# Patient Record
Sex: FEMALE | Race: WHITE | URBAN - METROPOLITAN AREA
[De-identification: names, ages, dates, MRNs, and addresses within clinical notes are randomized per-mention and may not be internally consistent; named-entity substitution may affect disease eponyms.]

---

## 2022-03-16 ENCOUNTER — OFFICE VISIT (OUTPATIENT)
Dept: URBAN - METROPOLITAN AREA CLINIC 22 | Facility: CLINIC | Age: 72
End: 2022-03-16
Payer: MEDICARE

## 2022-03-16 DIAGNOSIS — H25.813 COMBINED FORMS OF AGE-RELATED CATARACT, BILATERAL: Primary | ICD-10-CM

## 2022-03-16 DIAGNOSIS — H43.22 CRYSTALLINE DEPOSITS IN VITREOUS BODY, LEFT EYE: ICD-10-CM

## 2022-03-16 DIAGNOSIS — H43.811 VITREOUS DEGENERATION, RIGHT EYE: ICD-10-CM

## 2022-03-16 DIAGNOSIS — H35.371 PUCKERING OF MACULA, RIGHT EYE: ICD-10-CM

## 2022-03-16 PROCEDURE — 92134 CPTRZ OPH DX IMG PST SGM RTA: CPT | Performed by: STUDENT IN AN ORGANIZED HEALTH CARE EDUCATION/TRAINING PROGRAM

## 2022-03-16 PROCEDURE — 99204 OFFICE O/P NEW MOD 45 MIN: CPT | Performed by: STUDENT IN AN ORGANIZED HEALTH CARE EDUCATION/TRAINING PROGRAM

## 2022-03-16 ASSESSMENT — INTRAOCULAR PRESSURE
OD: 18
OS: 18

## 2022-03-16 ASSESSMENT — VISUAL ACUITY
OS: 20/20
OD: 20/25

## 2022-03-16 NOTE — IMPRESSION/PLAN
Impression: Crystalline deposits in vitreous body, left eye: H43.22. Plan: Discussed findings -- retina intact OU today. Reviewed signs and symptoms of retinal tear and detachment and patient educated to RTC STAT if experiencing.

## 2022-03-16 NOTE — IMPRESSION/PLAN
Impression: Puckering of macula, right eye: H35.371. Plan: Discussed findings. OCT ordered: mild ERM, minimal change to foveal contour and no cystic spaces. Treatment not indicated at this time. Monitor in 1 year with DFE and OCT macula.

## 2022-03-16 NOTE — IMPRESSION/PLAN
Impression: Combined forms of age-related cataract, bilateral: H25.813. Plan: Visually significant cataract OD, accounts for patient's complaints. OCT ordered and reviewed, shows mild ERM OD. Discussed risks/benefits of cataract surgery. Pt would like to pursue surgery -- recommend surgical consult. Briefly discussed advanced technology.

## 2022-04-13 ENCOUNTER — OFFICE VISIT (OUTPATIENT)
Dept: URBAN - METROPOLITAN AREA CLINIC 23 | Facility: CLINIC | Age: 72
End: 2022-04-13
Payer: MEDICARE

## 2022-04-13 DIAGNOSIS — H35.371 PUCKERING OF MACULA, RIGHT EYE: ICD-10-CM

## 2022-04-13 DIAGNOSIS — H25.13 AGE-RELATED NUCLEAR CATARACT, BILATERAL: Primary | ICD-10-CM

## 2022-04-13 PROCEDURE — 99203 OFFICE O/P NEW LOW 30 MIN: CPT | Performed by: OPHTHALMOLOGY

## 2022-04-13 NOTE — IMPRESSION/PLAN
Impression: Age-related nuclear cataract, bilateral: H25.13. Condition: established, worsening. Plan: Discussed diagnosis in detail with patient. No surgical treatment currently recommended. The patient will monitor vision changes and contact us with any decrease in vision. Recommend frequent use of artificial tears especially while reading. Recommend wearing yellow-orange tint glasses at night while driving to reduce glare.

## 2022-04-13 NOTE — IMPRESSION/PLAN
Impression: Puckering of macula, right eye: H35.371. Condition: established, stable. Pt has no ADL issues at this time. Plan: Discussed diagnosis in detail with patient. Advised patient of condition. Discussed treatment options with patient. No treatment is required at this time. Will continue to observe condition and or symptoms.

## 2024-05-24 ENCOUNTER — OFFICE VISIT (OUTPATIENT)
Dept: URBAN - METROPOLITAN AREA CLINIC 23 | Facility: CLINIC | Age: 74
End: 2024-05-24
Payer: COMMERCIAL

## 2024-05-24 DIAGNOSIS — H25.13 AGE-RELATED NUCLEAR CATARACT, BILATERAL: Primary | ICD-10-CM

## 2024-05-24 PROCEDURE — 99214 OFFICE O/P EST MOD 30 MIN: CPT | Performed by: OPHTHALMOLOGY

## 2024-05-24 RX ORDER — OFLOXACIN 3 MG/ML
0.3 % SOLUTION/ DROPS OPHTHALMIC
Qty: 5 | Refills: 1 | Status: ACTIVE
Start: 2024-05-24

## 2024-05-24 RX ORDER — KETOROLAC TROMETHAMINE 5 MG/ML
0.5 % SOLUTION OPHTHALMIC
Qty: 5 | Refills: 1 | Status: ACTIVE
Start: 2024-05-24

## 2024-05-24 RX ORDER — PREDNISOLONE ACETATE 10 MG/ML
1 % SUSPENSION/ DROPS OPHTHALMIC
Qty: 10 | Refills: 1 | Status: ACTIVE
Start: 2024-05-24

## 2024-05-24 ASSESSMENT — VISUAL ACUITY
OS: 20/25
OD: 20/40

## 2024-05-24 ASSESSMENT — KERATOMETRY
OS: 43.38
OD: 43.38

## 2024-05-24 ASSESSMENT — INTRAOCULAR PRESSURE
OD: 18
OS: 18

## 2024-06-05 ENCOUNTER — TECH ONLY (OUTPATIENT)
Dept: URBAN - METROPOLITAN AREA CLINIC 23 | Facility: CLINIC | Age: 74
End: 2024-06-05
Payer: COMMERCIAL

## 2024-06-05 DIAGNOSIS — H25.13 AGE-RELATED NUCLEAR CATARACT, BILATERAL: Primary | ICD-10-CM

## 2024-06-05 ASSESSMENT — PACHYMETRY
OD: 2.87
OS: 22.99
OS: 2.91
OD: 23.34

## 2024-06-13 ENCOUNTER — POST-OPERATIVE VISIT (OUTPATIENT)
Dept: URBAN - METROPOLITAN AREA CLINIC 23 | Facility: CLINIC | Age: 74
End: 2024-06-13
Payer: COMMERCIAL

## 2024-06-13 DIAGNOSIS — Z48.810 ENCOUNTER FOR SURGICAL AFTERCARE FOLLOWING SURGERY ON A SENSE ORGAN: Primary | ICD-10-CM

## 2024-06-13 PROCEDURE — 99024 POSTOP FOLLOW-UP VISIT: CPT

## 2024-06-13 ASSESSMENT — INTRAOCULAR PRESSURE
OS: 18
OD: 18

## 2024-06-18 ENCOUNTER — POST-OPERATIVE VISIT (OUTPATIENT)
Dept: URBAN - METROPOLITAN AREA CLINIC 23 | Facility: CLINIC | Age: 74
End: 2024-06-18
Payer: COMMERCIAL

## 2024-06-18 DIAGNOSIS — Z48.810 ENCOUNTER FOR SURGICAL AFTERCARE FOLLOWING SURGERY ON A SENSE ORGAN: Primary | ICD-10-CM

## 2024-06-18 PROCEDURE — 99024 POSTOP FOLLOW-UP VISIT: CPT

## 2024-06-18 ASSESSMENT — INTRAOCULAR PRESSURE
OS: 17
OD: 17

## 2024-06-18 ASSESSMENT — VISUAL ACUITY
OS: 20/30
OD: 20/40

## 2024-06-25 ENCOUNTER — SURGERY (OUTPATIENT)
Dept: URBAN - METROPOLITAN AREA SURGERY 11 | Facility: SURGERY | Age: 74
End: 2024-06-25
Payer: COMMERCIAL

## 2024-06-25 PROCEDURE — PR1CP PR1CP: CUSTOM | Performed by: OPHTHALMOLOGY

## 2024-06-25 PROCEDURE — 66984 XCAPSL CTRC RMVL W/O ECP: CPT | Performed by: OPHTHALMOLOGY

## 2024-06-26 ENCOUNTER — POST-OPERATIVE VISIT (OUTPATIENT)
Dept: URBAN - METROPOLITAN AREA CLINIC 23 | Facility: CLINIC | Age: 74
End: 2024-06-26
Payer: COMMERCIAL

## 2024-06-26 DIAGNOSIS — Z96.1 PRESENCE OF INTRAOCULAR LENS: Primary | ICD-10-CM

## 2024-06-26 PROCEDURE — 99024 POSTOP FOLLOW-UP VISIT: CPT

## 2024-06-26 ASSESSMENT — INTRAOCULAR PRESSURE
OS: 18
OD: 18

## 2024-07-02 ENCOUNTER — POST-OPERATIVE VISIT (OUTPATIENT)
Dept: URBAN - METROPOLITAN AREA CLINIC 23 | Facility: CLINIC | Age: 74
End: 2024-07-02
Payer: COMMERCIAL

## 2024-07-02 DIAGNOSIS — Z96.1 PRESENCE OF INTRAOCULAR LENS: Primary | ICD-10-CM

## 2024-07-02 PROCEDURE — 99024 POSTOP FOLLOW-UP VISIT: CPT

## 2024-07-02 ASSESSMENT — KERATOMETRY
OS: 43.13
OD: 43.13

## 2024-07-02 ASSESSMENT — INTRAOCULAR PRESSURE
OS: 17
OD: 18

## 2024-07-23 ENCOUNTER — POST-OPERATIVE VISIT (OUTPATIENT)
Dept: URBAN - METROPOLITAN AREA CLINIC 23 | Facility: CLINIC | Age: 74
End: 2024-07-23
Payer: COMMERCIAL

## 2024-07-23 DIAGNOSIS — Z96.1 PRESENCE OF INTRAOCULAR LENS: Primary | ICD-10-CM

## 2024-07-23 PROCEDURE — 99024 POSTOP FOLLOW-UP VISIT: CPT

## 2024-07-23 ASSESSMENT — INTRAOCULAR PRESSURE
OD: 16
OS: 16

## 2025-04-15 ENCOUNTER — OFFICE VISIT (OUTPATIENT)
Dept: URBAN - METROPOLITAN AREA CLINIC 23 | Facility: CLINIC | Age: 75
End: 2025-04-15
Payer: MEDICARE

## 2025-04-15 DIAGNOSIS — Z96.1 PRESENCE OF INTRAOCULAR LENS: ICD-10-CM

## 2025-04-15 DIAGNOSIS — H52.4 PRESBYOPIA: ICD-10-CM

## 2025-04-15 DIAGNOSIS — E11.9 TYPE 2 DIABETES MELLITUS W/O COMPLICATION: Primary | ICD-10-CM

## 2025-04-15 DIAGNOSIS — H43.813 VITREOUS DEGENERATION, BILATERAL: ICD-10-CM

## 2025-04-15 DIAGNOSIS — H04.123 DRY EYE SYNDROME OF BILATERAL LACRIMAL GLANDS: ICD-10-CM

## 2025-04-15 PROCEDURE — 99213 OFFICE O/P EST LOW 20 MIN: CPT

## 2025-04-15 ASSESSMENT — INTRAOCULAR PRESSURE
OD: 12
OS: 12

## 2025-04-15 ASSESSMENT — VISUAL ACUITY
OD: 20/20
OS: 20/25

## 2025-04-15 ASSESSMENT — KERATOMETRY
OD: 43.38
OS: 43.38